# Patient Record
Sex: MALE | Race: ASIAN | Employment: OTHER | URBAN - METROPOLITAN AREA
[De-identification: names, ages, dates, MRNs, and addresses within clinical notes are randomized per-mention and may not be internally consistent; named-entity substitution may affect disease eponyms.]

---

## 2024-01-25 ENCOUNTER — OFFICE VISIT (OUTPATIENT)
Dept: FAMILY MEDICINE CLINIC | Facility: CLINIC | Age: 49
End: 2024-01-25

## 2024-01-25 VITALS
SYSTOLIC BLOOD PRESSURE: 108 MMHG | RESPIRATION RATE: 16 BRPM | HEART RATE: 76 BPM | BODY MASS INDEX: 25.19 KG/M2 | HEIGHT: 72 IN | WEIGHT: 186 LBS | OXYGEN SATURATION: 99 % | DIASTOLIC BLOOD PRESSURE: 64 MMHG | TEMPERATURE: 98 F

## 2024-01-25 DIAGNOSIS — H57.89 EYE REDNESS: Primary | ICD-10-CM

## 2024-01-25 PROCEDURE — 99202 OFFICE O/P NEW SF 15 MIN: CPT | Performed by: NURSE PRACTITIONER

## 2024-01-25 RX ORDER — ERYTHROMYCIN 5 MG/G
1 OINTMENT OPHTHALMIC 3 TIMES DAILY
Qty: 1 G | Refills: 0 | Status: SHIPPED | OUTPATIENT
Start: 2024-01-25 | End: 2024-02-01

## 2024-01-26 NOTE — PROGRESS NOTES
CHIEF COMPLAINT:     Chief Complaint   Patient presents with    Eye Problem     Left eye redness, discomfort, watering, crusting in the morning, x2 days        HPI:   Uli Tovar is a 48 year old male who presents with chief complaint of \"pink eye\". Symptoms began  2  days ago. Symptoms have been same since onset.   Patient reports left eye redness, mild eyelid crusting, no discharge, no itching, no eyelid swelling, + tearing. Contact lens wearer: no.   Denies fever, change in vision, sensitivity to light, pain with eye movement, cold symptoms, foreign body sensation, eye injury, or contact with irritant.     Treatments tried: visine   Previous eye surgery: no  Was on multiple flights    Current Outpatient Medications   Medication Sig Dispense Refill    erythromycin 5 MG/GM Ophthalmic Ointment Place 1 Application into the left eye 3 (three) times daily for 7 days. One-half inch (1.25 cm) three times daily for 7 days 1 g 0      History reviewed. No pertinent past medical history.   History reviewed. No pertinent surgical history.   History reviewed. No pertinent family history.   Social History     Socioeconomic History    Marital status:          REVIEW OF SYSTEMS:   GENERAL: feels well otherwise  SKIN: no rashes  EYES:denies blurred vision or double vision. See HPI  HENT: denies ear pain, congestion, sore throat  LUNGS: denies shortness of breath or cough  CARDIOVASCULAR: denies chest pain or palpitations   GI: denies N/V/C or abdominal pain  NEURO: denies headaches     EXAM:   /64   Pulse 76   Temp 97.6 °F (36.4 °C) (Temporal)   Resp 16   Ht 6' (1.829 m)   Wt 186 lb (84.4 kg)   SpO2 99%   BMI 25.23 kg/m²   GENERAL: well developed, well nourished,in no apparent distress  SKIN: no rashes,no suspicious lesions  EYES: PERRLA, EOMI, normal optic disc; left conjunctiva erythematous, injected, no discharge, + tearing,  no lid swelling.  No swelling or redness of periorbital tissue.  Vision WNL - right  eye : 20/25, left eye 20/25.    HENT: atraumatic, normocephalic,ears and throat are clear  NECK: supple, non tender  LUNGS: clear to auscultation bilaterally.   CARDIO: RRR without murmur  LYMPH: no preauricular lymphadenopathy. No cervical lymphadenopathy      ASSESSMENT AND PLAN:   Uli Tovar is a 48 year old male who presents with:    ASSESSMENT:   Encounter Diagnosis   Name Primary?    Eye redness Yes   1. Eye redness  - erythromycin 5 MG/GM Ophthalmic Ointment; Place 1 Application into the left eye 3 (three) times daily for 7 days. One-half inch (1.25 cm) three times daily for 7 days  Dispense: 1 g; Refill: 0  Will cover for possible abrasion.     PLAN: Hygiene and comfort care as listed in patient instructions.    Medication and instructions as listed below  Warm compresses to affected eye prn.  Advised patient to avoid touching eyes; importancestressed  of good handwashing.    Risks, benefits, complications and side effects of meds discussed.  Follow up with PCP or eye doctor if not improved in 2-3 days    Requested Prescriptions     Signed Prescriptions Disp Refills    erythromycin 5 MG/GM Ophthalmic Ointment 1 g 0     Sig: Place 1 Application into the left eye 3 (three) times daily for 7 days. One-half inch (1.25 cm) three times daily for 7 days         See pt handout    The patient verbalizes understanding and is in agreement with treatment plan.

## 2024-01-29 ENCOUNTER — HOSPITAL ENCOUNTER (OUTPATIENT)
Age: 49
Discharge: HOME | End: 2024-01-29
Attending: EMERGENCY MEDICINE

## 2024-01-29 VITALS
BODY MASS INDEX: 24.38 KG/M2 | TEMPERATURE: 98 F | HEIGHT: 72 IN | WEIGHT: 180 LBS | RESPIRATION RATE: 18 BRPM | HEART RATE: 92 BPM | OXYGEN SATURATION: 98 % | SYSTOLIC BLOOD PRESSURE: 146 MMHG | DIASTOLIC BLOOD PRESSURE: 106 MMHG

## 2024-01-29 DIAGNOSIS — H10.33 ACUTE CONJUNCTIVITIS OF BOTH EYES, UNSPECIFIED ACUTE CONJUNCTIVITIS TYPE: Primary | ICD-10-CM

## 2024-01-29 PROCEDURE — 99213 OFFICE O/P EST LOW 20 MIN: CPT

## 2024-01-29 PROCEDURE — 99203 OFFICE O/P NEW LOW 30 MIN: CPT

## 2024-01-29 RX ORDER — OFLOXACIN 3 MG/ML
1 SOLUTION/ DROPS OPHTHALMIC 4 TIMES DAILY
Qty: 5 ML | Refills: 0 | Status: SHIPPED | OUTPATIENT
Start: 2024-01-29

## 2024-01-29 RX ORDER — TETRACAINE HYDROCHLORIDE 5 MG/ML
2 SOLUTION OPHTHALMIC ONCE
Status: COMPLETED | OUTPATIENT
Start: 2024-01-29 | End: 2024-01-29

## 2024-01-29 NOTE — DISCHARGE INSTRUCTIONS
We made an appointment at 1215 for Dr. Leon.  Call to confirm this or cancel or reschedule if you decide you are not going to use this appointment.  I wrote a prescription for ofloxacin but I would wait until you see Dr. Leon to have it filled.  Assuming you keep the 1215 appointment

## 2024-01-29 NOTE — ED INITIAL ASSESSMENT (HPI)
Pt states he noticed left eye redness since Tues, went to an urgent care on Thurs and prescribed erythromycin to eye.   States since Friday, pt noticed swelling to left eye.   Then noticed redness to rt eye.   Draining to eyes.

## 2024-01-29 NOTE — ED PROVIDER NOTES
Patient Seen in: [facility]      History     Chief Complaint   Patient presents with    Eye Visual Problem     Stated Complaint: Eye Issue    Subjective:   HPI    48-year-old male complaining of left eye pain patient describes that he had left eye pain that started about a week ago he went to urgent care few days ago was given erythromycin ointment he had redness and discharge from his eye now over the past few days despite using erythromycin ointment has had some increasing discomfort and swelling of the upper eyelid of left eye and then now some redness and drainage to right eye.  He had mild URI symptoms somewhat before this no history of trauma does not wear contacts.  No previous eye problems.    Objective:   History reviewed. No pertinent past medical history.           History reviewed. No pertinent surgical history.             No pertinent social history.            Review of Systems    Positive for stated complaint: Eye Issue  Other systems are as noted in HPI.  Constitutional and vital signs reviewed.      All other systems reviewed and negative except as noted above.    Physical Exam     ED Triage Vitals [01/29/24 0926]   BP (!) 146/106   Pulse 92   Resp 18   Temp 97.7 °F (36.5 °C)   Temp src Tympanic   SpO2 98 %   O2 Device None (Room air)       Current:BP (!) 146/106   Pulse 92   Temp 97.7 °F (36.5 °C) (Tympanic)   Resp 18   Ht 182.9 cm (6')   Wt 81.6 kg   SpO2 98%   BMI 24.41 kg/m²     Right Eye Chart Acuity: 20/25, Uncorrected  Left Eye Chart Acuity: 20/25, Uncorrected    Physical Exam    Patient is alert and orient x 3 no acute distress HEENT exam the left eye there is moderate injection of the conjunctiva the upper eyelid is moderately reddened and edematous the lower eyelids mildly reddened and edematous no foreign bodies noted anterior chamber appears normal there is a small pinpoint corneal abrasion about the mid aspect between the center and the periphery of the cornea  at about the 10 o'clock position.  The right eye the conjunctiva is mildly injected the lids appear normal cornea appears clear anterior chamber appears normal fundi are not well-visualized.    ED Course   Labs Reviewed - No data to display                   MDM      Initial differential diagnoses considered but not limited to includes conjunctivitis hordeolum cellulitis corneal abrasion corneal ulceration.  Patient has small uptake of fluorescein stain concerned about the possibility of early corneal ulceration he has rather extensive appearing findings consistent conjunctivitis but moderate swelling I contacted ophthalmology and he was given appointment to follow-up today and given prescription for antibiotic drops advised him to discuss this with the ophthalmologist before refills.                                 Medical Decision Making      Disposition and Plan     Clinical Impression:  1. Acute conjunctivitis of both eyes, unspecified acute conjunctivitis type         Disposition:  Discharge  1/29/2024  9:54 am    Follow-up:  [address] [id number] [phone]  Today  1215 appointment          Medications Prescribed:  Discharge Medication List as of 1/29/2024  9:57 AM        START taking these medications    Details   ofloxacin 0.3 % Ophthalmic Solution Place 1 drop into both eyes 4 (four) times daily., Normal, Disp-5 mL, R-0